# Patient Record
Sex: FEMALE | Race: WHITE | NOT HISPANIC OR LATINO | Employment: OTHER | URBAN - NONMETROPOLITAN AREA
[De-identification: names, ages, dates, MRNs, and addresses within clinical notes are randomized per-mention and may not be internally consistent; named-entity substitution may affect disease eponyms.]

---

## 2021-03-05 ENCOUNTER — IMMUNIZATION (OUTPATIENT)
Dept: VACCINE CLINIC | Facility: HOSPITAL | Age: 76
End: 2021-03-05

## 2021-03-05 PROCEDURE — 91300 HC SARSCOV02 VAC 30MCG/0.3ML IM: CPT | Performed by: INTERNAL MEDICINE

## 2021-03-05 PROCEDURE — 0001A: CPT | Performed by: INTERNAL MEDICINE

## 2021-03-26 ENCOUNTER — IMMUNIZATION (OUTPATIENT)
Dept: VACCINE CLINIC | Facility: HOSPITAL | Age: 76
End: 2021-03-26

## 2021-03-26 PROCEDURE — 91300 HC SARSCOV02 VAC 30MCG/0.3ML IM: CPT | Performed by: INTERNAL MEDICINE

## 2021-03-26 PROCEDURE — 0002A: CPT | Performed by: INTERNAL MEDICINE

## 2021-11-19 DIAGNOSIS — Z94.4 LIVER TRANSPLANT STATUS (HCC): ICD-10-CM

## 2021-11-19 DIAGNOSIS — I10 ESSENTIAL (PRIMARY) HYPERTENSION: ICD-10-CM

## 2021-11-19 RX ORDER — ASPIRIN 81 MG/1
81 TABLET ORAL DAILY
Qty: 30 TABLET | Refills: 5 | OUTPATIENT
Start: 2021-11-19

## 2021-11-19 RX ORDER — CARVEDILOL 12.5 MG/1
18.75 TABLET ORAL DAILY
Qty: 45 TABLET | Refills: 1 | OUTPATIENT
Start: 2021-11-19

## 2021-11-19 RX ORDER — AMLODIPINE BESYLATE 2.5 MG/1
2.5 TABLET ORAL DAILY
Qty: 30 TABLET | Refills: 5 | OUTPATIENT
Start: 2021-11-19

## 2021-11-19 NOTE — TELEPHONE ENCOUNTER
Received fax from pharmacy requesting refill on pts medication(s). Pt was last seen in office on Visit date not found  and has a follow up scheduled for Visit date not found.      Requested Prescriptions     Refused Prescriptions Disp Refills    amLODIPine (NORVASC) 2.5 MG tablet [Pharmacy Med Name: amLODIPine 2.5 mg tablet (NORVASC)] 30 tablet 5     Sig: Take 1 tablet by mouth daily     Refused By: Abner Reina     Reason for Refusal: Patient needs an appointment    aspirin (ASPIRIN LOW DOSE) 81 MG EC tablet 30 tablet 5     Sig: Take 1 tablet by mouth daily     Refused By: Abner Reina     Reason for Refusal: Patient needs an appointment    carvedilol (COREG) 12.5 MG tablet [Pharmacy Med Name: carvediloL 12.5 mg tablet (COREG)] 45 tablet 1     Sig: Take 1.5 tablets by mouth daily     Refused By: Abner Reina     Reason for Refusal: Patient needs an appointment

## 2022-03-28 DIAGNOSIS — I10 ESSENTIAL (PRIMARY) HYPERTENSION: ICD-10-CM

## 2022-03-28 DIAGNOSIS — Z94.4 LIVER TRANSPLANT STATUS (HCC): ICD-10-CM

## 2022-03-28 RX ORDER — AMLODIPINE BESYLATE 2.5 MG/1
TABLET ORAL
Qty: 30 TABLET | Refills: 5 | OUTPATIENT
Start: 2022-03-28

## 2022-03-28 NOTE — TELEPHONE ENCOUNTER
Requested Prescriptions     Pending Prescriptions Disp Refills    amLODIPine (NORVASC) 2.5 MG tablet [Pharmacy Med Name: amLODIPine 2.5 mg tablet (NORVASC)] 30 tablet 5     Sig: Take 1 tablet (2.5 mg total) by mouth daily.